# Patient Record
Sex: MALE | Race: WHITE
[De-identification: names, ages, dates, MRNs, and addresses within clinical notes are randomized per-mention and may not be internally consistent; named-entity substitution may affect disease eponyms.]

---

## 2018-05-16 ENCOUNTER — HOSPITAL ENCOUNTER (EMERGENCY)
Dept: HOSPITAL 62 - ER | Age: 43
Discharge: HOME | End: 2018-05-16
Payer: COMMERCIAL

## 2018-05-16 VITALS — DIASTOLIC BLOOD PRESSURE: 105 MMHG | SYSTOLIC BLOOD PRESSURE: 155 MMHG

## 2018-05-16 DIAGNOSIS — M79.89: ICD-10-CM
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
## 2018-05-16 DIAGNOSIS — M25.562: ICD-10-CM

## 2018-05-16 DIAGNOSIS — R03.0: ICD-10-CM

## 2018-05-16 DIAGNOSIS — M70.42: Primary | ICD-10-CM

## 2018-05-16 PROCEDURE — 99283 EMERGENCY DEPT VISIT LOW MDM: CPT

## 2018-05-16 NOTE — RADIOLOGY REPORT (SQ)
EXAM DESCRIPTION:  KNEE LEFT 4 VIEW



COMPLETED DATE/TIME:  5/16/2018 8:31 pm



REASON FOR STUDY:  left knee pain



COMPARISON:  None.



NUMBER OF VIEWS:  Four views.



TECHNIQUE:  AP, lateral, and both oblique radiographic images acquired of the left knee.



LIMITATIONS:  None.



FINDINGS:  MINERALIZATION: Normal.

BONES: No acute fracture or dislocation. No worrisome bone lesions. No significant osteophytes.

JOINT: No effusion.  No chondrocalcinosis.

OTHER: No other significant finding.



IMPRESSION:  NEGATIVE STUDY OF THE LEFT KNEE. NO EXPLANATION FOR PAIN.



TECHNICAL DOCUMENTATION:  JOB ID:  [id number]

 2011 [employer]- All Rights Reserved



Reading location - IP/workstation name: [email or web address]

## 2018-05-16 NOTE — ER DOCUMENT REPORT
ED Extremity Problem, Lower





- General


Chief Complaint: Knee Pain


Stated Complaint: KNEE PAIN


Time Seen by Provider: 05/16/18 20:55


Mode of Arrival: Ambulatory


Information source: Patient


TRAVEL OUTSIDE OF THE [place] IN LAST 30 DAYS: No





- HPI


Patient complains to provider of: Pain


Location: Knee


Notes: 





Patient is here with complaints of left knee pain.  He states that he did a lot 

of work around his house including mowing some grass on a steep incline.  He 

states that after he finished doing that that he was having some pain in his 

left knee that then seemed to subside.  Today while at work he stood up and had 

worsening pain in the left knee states that now it seems a little swollen and 

red.  He denies any fever.  He denies any numbness, tingling, weakness.  No 

traumatic injury.  He denies any history of diabetes.  No blood thinners.  No 

rash.  No chest pain or shortness of breath.  No nausea, vomiting, diarrhea.  

Pain in the knee is worse if he flexes the knee or touches the kneecap.  This 

better when he is walking as well as when he straightens it out.  He is able to 

bear weight without significant difficulty.





- Related Data


Allergies/Adverse Reactions: 


 





No Known Allergies Allergy (Unverified 05/16/18 19:40)


 











Past Medical History





- Social History


Smoking Status: Unknown if Ever Smoked


Frequency of alcohol use: None


Drug Abuse: None


Family History: Reviewed & Not Pertinent


Patient has suicidal ideation: No


Patient has homicidal ideation: No


Renal/ Medical History: Denies: Hx Peritoneal Dialysis





Review of Systems





- Review of Systems


-: Yes All other systems reviewed and negative





Physical Exam





- Vital signs


Vitals: 





 











Temp Pulse Resp BP Pulse Ox


 


 98.4 F   102 H  18   164/107 H  97 


 


 05/16/18 19:42  05/16/18 19:42  05/16/18 19:42  05/16/18 19:42  05/16/18 19:42














- Notes


Notes: 





GENERAL: alert, cooperative, nontoxic, no distress.


HEAD: normocephalic, atraumatic


EYES: conjunctiva pink without discharge, no external redness or swelling.


EARS: no external swelling, no external redness


NOSE: atraumatic, no external swelling


MOUTH/THROAT: mucous membranes moist and pink


NECK: soft, supple, full range of motion, no meningismus.


CHEST: no distress, lungs clear and equal throughout.  No wheezing, rales, 

rhonchi.


CARDIAC: regular rate and rhythm, no murmur, normal capillary refill, normal 

pulses.  


BACK: full range of motion, no CVA tenderness.


EXTREMITIES: full range of motion of all extremities.  Tenderness to palpation 

of the left anterior knee just below the patella with minimal redness and 

swelling to the area.  There is no redness to the joint itself.  He has full 

flexion and extension.  No ligament instability.  Normal pulse and sensation 

distally.  Compartments are soft.


NEURO: alert and oriented 3, no focal deficits, full range of motion of all 

extremities.


PYSCH: appropriate mood, affect.  Patient is cooperative.


SKIN: pink, warm, dry, no rash.








Course





- Re-evaluation


Re-evalutation: 





05/16/18 21:27


Patient is nontoxic appearing with stable vitals.  The patient is here with 

complaints of left knee pain.  Pain started after cutting grass on a steep 

incline and got significantly worse over the last 24 hours.  There is no 

traumatic injury.  No fever.  On exam he has some mild prepatellar swelling and 

tenderness with mild redness to the area.  The entire joint is not red or 

swollen.  There is no complete joint effusion.  No ligament instability.  

Compartments are soft.  Normal neurovascular exam.  X-ray of the left knee 

shows no acute abnormality.  Patient likely has some prepatellar bursitis.  He 

will be discharged home with a prescription for Naprosyn and Ultram.  He had an 

Ace wrap applied as needed for comfort.  Instructed to rest, ice, elevate.  

Follow-up with his primary care doctor or orthopedics if not better in 1 week, 

sooner for worsening pain, fever, numbness, tingling, weakness, any further 

concerns.





The patient is noted to have elevated blood pressure during today's emergency 

department visit.  The patient was informed of this finding.  The patient was 

instructed that this may be related to pre-hypertension and requires further 

evaluation with a primary care provider.  The patient has no hypertensive 

symptoms at this time.





The patient's emergency department workup and current diagnosis were explained 

to the patient and or family.  Follow-up instructions were provided.  

Medications if prescribed were discussed. Instructions for when to return to 

the emergency department including specific  worrisome symptoms were discussed 

with the patient and/or family.





- Vital Signs


Vital signs: 





 











Temp Pulse Resp BP Pulse Ox


 


 98.4 F   102 H  18   164/107 H  97 


 


 05/16/18 19:42  05/16/18 19:42  05/16/18 19:42  05/16/18 19:42  05/16/18 19:42














- Diagnostic Test


Radiology reviewed: Image reviewed, Reports reviewed - Negative left knee





Procedures





- Immobilization


  ** knee


Pre-Proc Neuro Vasc Exam: Normal


Immobilizer type: Ace wrap


Performed by: RN


Post-Proc Neuro Vasc Exam: Normal


Alignment checked and good: Yes





Discharge





- Discharge


Clinical Impression: 


Prepatellar bursitis


Qualifiers:


 Laterality: left Qualified Code(s): M70.42 - Prepatellar bursitis, left knee





Condition: Stable


Disposition: HOME, SELF-CARE


Instructions:  Ice & Elevation ([facility]), Bursitis ([facility])


Additional Instructions: 


Take medication as prescribed.  Wear Ace wrap as needed for comfort.  Rest, ice

, elevate.  Follow-up if not better in 1 week, sooner for worsening pain, fever

, redness, numbness, tingling, weakness, any further concerns.





Your blood pressure was elevated during today's visit.  Have this rechecked 

with your doctor.





The medication you were prescribed today may cause drowsiness.  Do not drive or 

operate heavy machinery while taking this medication.


Prescriptions: 


Tramadol HCl [Ultram 50 mg Tablet] 50 mg PO Q6HP PRN #12 tablet


 PRN Reason: 


Naproxen [Naprosyn] 500 mg PO BID #20 tablet


Forms:  Elevated Blood Pressure, Smoking Cessation Education


Referrals: 


JUAN JOSE CARDONA MD [ACTIVE STAFF] - Follow up as needed